# Patient Record
Sex: FEMALE | Race: BLACK OR AFRICAN AMERICAN | NOT HISPANIC OR LATINO | URBAN - METROPOLITAN AREA
[De-identification: names, ages, dates, MRNs, and addresses within clinical notes are randomized per-mention and may not be internally consistent; named-entity substitution may affect disease eponyms.]

---

## 2017-02-28 ENCOUNTER — EMERGENCY (EMERGENCY)
Facility: HOSPITAL | Age: 22
LOS: 1 days | Discharge: HOME | End: 2017-02-28
Attending: EMERGENCY MEDICINE
Payer: SELF-PAY

## 2017-02-28 VITALS
DIASTOLIC BLOOD PRESSURE: 69 MMHG | RESPIRATION RATE: 18 BRPM | SYSTOLIC BLOOD PRESSURE: 130 MMHG | TEMPERATURE: 98 F | HEART RATE: 83 BPM | OXYGEN SATURATION: 100 %

## 2017-02-28 VITALS
RESPIRATION RATE: 18 BRPM | HEIGHT: 61 IN | OXYGEN SATURATION: 100 % | WEIGHT: 160.06 LBS | DIASTOLIC BLOOD PRESSURE: 72 MMHG | TEMPERATURE: 98 F | SYSTOLIC BLOOD PRESSURE: 135 MMHG | HEART RATE: 80 BPM

## 2017-02-28 DIAGNOSIS — O21.9 VOMITING OF PREGNANCY, UNSPECIFIED: ICD-10-CM

## 2017-02-28 DIAGNOSIS — R42 DIZZINESS AND GIDDINESS: ICD-10-CM

## 2017-02-28 DIAGNOSIS — O26.899 OTHER SPECIFIED PREGNANCY RELATED CONDITIONS, UNSPECIFIED TRIMESTER: ICD-10-CM

## 2017-02-28 DIAGNOSIS — Z3A.00 WEEKS OF GESTATION OF PREGNANCY NOT SPECIFIED: ICD-10-CM

## 2017-02-28 LAB
APPEARANCE UR: CLEAR — SIGNIFICANT CHANGE UP
BILIRUB UR-MCNC: NEGATIVE — SIGNIFICANT CHANGE UP
COLOR SPEC: YELLOW — SIGNIFICANT CHANGE UP
DIFF PNL FLD: NEGATIVE — SIGNIFICANT CHANGE UP
GLUCOSE UR QL: NEGATIVE — SIGNIFICANT CHANGE UP
HCG UR QL: POSITIVE
KETONES UR-MCNC: NEGATIVE — SIGNIFICANT CHANGE UP
LEUKOCYTE ESTERASE UR-ACNC: NEGATIVE — SIGNIFICANT CHANGE UP
NITRITE UR-MCNC: NEGATIVE — SIGNIFICANT CHANGE UP
PH UR: 7 — SIGNIFICANT CHANGE UP (ref 4.8–8)
PROT UR-MCNC: NEGATIVE — SIGNIFICANT CHANGE UP
SP GR SPEC: 1.01 — SIGNIFICANT CHANGE UP (ref 1.01–1.02)
UROBILINOGEN FLD QL: NEGATIVE — SIGNIFICANT CHANGE UP

## 2017-02-28 PROCEDURE — 99053 MED SERV 10PM-8AM 24 HR FAC: CPT

## 2017-02-28 PROCEDURE — 99284 EMERGENCY DEPT VISIT MOD MDM: CPT | Mod: 25

## 2017-02-28 PROCEDURE — 81025 URINE PREGNANCY TEST: CPT

## 2017-02-28 PROCEDURE — 93005 ELECTROCARDIOGRAM TRACING: CPT

## 2017-02-28 PROCEDURE — 99283 EMERGENCY DEPT VISIT LOW MDM: CPT

## 2017-02-28 PROCEDURE — 81003 URINALYSIS AUTO W/O SCOPE: CPT

## 2017-02-28 RX ORDER — ONDANSETRON 8 MG/1
4 TABLET, FILM COATED ORAL ONCE
Qty: 0 | Refills: 0 | Status: COMPLETED | OUTPATIENT
Start: 2017-02-28 | End: 2017-02-28

## 2017-02-28 RX ORDER — SODIUM CHLORIDE 9 MG/ML
3 INJECTION INTRAMUSCULAR; INTRAVENOUS; SUBCUTANEOUS ONCE
Qty: 0 | Refills: 0 | Status: COMPLETED | OUTPATIENT
Start: 2017-02-28 | End: 2017-02-28

## 2017-02-28 RX ORDER — SODIUM CHLORIDE 9 MG/ML
1000 INJECTION INTRAMUSCULAR; INTRAVENOUS; SUBCUTANEOUS ONCE
Qty: 0 | Refills: 0 | Status: COMPLETED | OUTPATIENT
Start: 2017-02-28 | End: 2017-02-28

## 2017-02-28 RX ORDER — ONDANSETRON 8 MG/1
4 TABLET, FILM COATED ORAL ONCE
Qty: 0 | Refills: 0 | Status: DISCONTINUED | OUTPATIENT
Start: 2017-02-28 | End: 2017-03-04

## 2017-02-28 NOTE — ED PROVIDER NOTE - MEDICAL DECISION MAKING DETAILS
20 y/o F presenting with syncope and non specific dizziness, nausea, vomiting. Pt has normal neuro exam, benign abd exam and normal EKG in ED. Will r/o pregnancy, anemia, electrolyte abnormality. Will check labs, UA, give Zofran, IV fluid and re-assess.

## 2017-02-28 NOTE — ED PROVIDER NOTE - NS ED MD SCRIBE ATTENDING SCRIBE SECTIONS
PAST MEDICAL/SURGICAL/SOCIAL HISTORY/VITAL SIGNS( Pullset)/REVIEW OF SYSTEMS/DISPOSITION/HIV/HISTORY OF PRESENT ILLNESS/PHYSICAL EXAM

## 2017-02-28 NOTE — ED PROVIDER NOTE - OBJECTIVE STATEMENT
22 y/o F with no significant PMHx presents to ED c/o moderate dizziness, described as lightheadedness, non vertiginous, x 1 week with occasional NBNB vomiting. Pt describes feeling dizzy and faint for several seconds. Denies any injuries, headache, numbness, weakness, chest pain, abd pain, shortness of breath or any other complaints. Pt has positive history of anemia, denies drug use. Pt can not recall LMP. 20 y/o F with no significant PMHx presents to ED c/o moderate dizziness, described as lightheadedness, non vertiginous, x 1 week with occasional NBNB vomiting. Pt describes feeling dizzy and fainted for about 2 seconds. Denies any injuries, headache, numbness, weakness, chest pain, abd pain, shortness of breath or any other complaints. Pt has positive history of anemia, denies drug use. Pt LMP in january possibly, cant recall for sure. no vag bleed or pelvic pain

## 2017-02-28 NOTE — ED PROVIDER NOTE - PROGRESS NOTE DETAILS
pt refusing blood work. afraid of needles/blood draw. unable to convince pt to get test done. pt c +preg. likely explaining her sxs. pt hemodynamically stable. no abd pain/tenderness. can f/u c her OB outpt

## 2017-02-28 NOTE — ED ADULT NURSE NOTE - OBJECTIVE STATEMENT
21 years old female received sitting bed alert and oriented x3, breathing spontaneously on room air. Patient complained of nausea and vomiting for over a week, also reported headache, lightheaded, and stated that she fainted earlier. Patient denies any blurred vision, chest pains or chest discomfort , abdominal pains or diarrhea vomited x5-6.No reports of fever stated. P/S 4-6/10

## 2017-04-02 ENCOUNTER — EMERGENCY (EMERGENCY)
Facility: HOSPITAL | Age: 22
LOS: 1 days | Discharge: HOME | End: 2017-04-02
Attending: EMERGENCY MEDICINE
Payer: MEDICAID

## 2017-04-02 VITALS
SYSTOLIC BLOOD PRESSURE: 111 MMHG | RESPIRATION RATE: 18 BRPM | OXYGEN SATURATION: 98 % | HEART RATE: 86 BPM | DIASTOLIC BLOOD PRESSURE: 66 MMHG | TEMPERATURE: 99 F

## 2017-04-02 VITALS
DIASTOLIC BLOOD PRESSURE: 63 MMHG | HEART RATE: 93 BPM | WEIGHT: 164.02 LBS | RESPIRATION RATE: 16 BRPM | SYSTOLIC BLOOD PRESSURE: 119 MMHG | TEMPERATURE: 99 F | OXYGEN SATURATION: 99 %

## 2017-04-02 DIAGNOSIS — O99.89 OTHER SPECIFIED DISEASES AND CONDITIONS COMPLICATING PREGNANCY, CHILDBIRTH AND THE PUERPERIUM: ICD-10-CM

## 2017-04-02 DIAGNOSIS — M54.5 LOW BACK PAIN: ICD-10-CM

## 2017-04-02 DIAGNOSIS — Z3A.09 9 WEEKS GESTATION OF PREGNANCY: ICD-10-CM

## 2017-04-02 PROCEDURE — 99283 EMERGENCY DEPT VISIT LOW MDM: CPT | Mod: 25

## 2017-04-02 PROCEDURE — 99053 MED SERV 10PM-8AM 24 HR FAC: CPT

## 2017-04-02 PROCEDURE — 99283 EMERGENCY DEPT VISIT LOW MDM: CPT

## 2017-04-02 RX ORDER — ACETAMINOPHEN 500 MG
975 TABLET ORAL ONCE
Qty: 0 | Refills: 0 | Status: COMPLETED | OUTPATIENT
Start: 2017-04-02 | End: 2017-04-02

## 2017-04-02 RX ADMIN — Medication 975 MILLIGRAM(S): at 07:34

## 2017-04-02 NOTE — ED PROVIDER NOTE - OBJECTIVE STATEMENT
23yo F w chr low back pain in the ER for worsening pain, midline. Non-radiating, no neuro stx. No GI/ stx. 9 wks pregnant, 23yo F w chr low back pain in the ER for worsening pain, midline. Non-radiating, no neuro stx. No GI/ stx. 9 wks pregnant, had US w IUP few days ago. No abdom pains, no vag bleeding

## 2017-04-02 NOTE — ED ADULT NURSE NOTE - OBJECTIVE STATEMENT
Pt. is aox3 to by private car c/o lower back pain made worst by pregnancy. pt staes she has had this pain for a long time. now at 9 weeks preg. pain is worst when walking

## 2017-04-02 NOTE — ED PROVIDER NOTE - MEDICAL DECISION MAKING DETAILS
21yo F w chr back pain, no radiation, neuro intact, no CVAT. Likely MSK. Will DC w Tylenol and OB/spine specialist

## 2017-04-02 NOTE — ED ADULT TRIAGE NOTE - CHIEF COMPLAINT QUOTE
severe back pain today , unable to walk normally 8 weeks pregnant, severe back pain today , unable to walk normally

## 2017-04-07 PROBLEM — Z00.00 ENCOUNTER FOR PREVENTIVE HEALTH EXAMINATION: Status: ACTIVE | Noted: 2017-04-07

## 2017-04-12 ENCOUNTER — OUTPATIENT (OUTPATIENT)
Dept: OUTPATIENT SERVICES | Facility: HOSPITAL | Age: 22
LOS: 1 days | End: 2017-04-12

## 2017-04-12 ENCOUNTER — APPOINTMENT (OUTPATIENT)
Dept: ORTHOPEDIC SURGERY | Facility: HOSPITAL | Age: 22
End: 2017-04-12

## 2017-04-12 VITALS
HEART RATE: 66 BPM | BODY MASS INDEX: 31.63 KG/M2 | WEIGHT: 167.5 LBS | DIASTOLIC BLOOD PRESSURE: 57 MMHG | SYSTOLIC BLOOD PRESSURE: 111 MMHG | HEIGHT: 61 IN

## 2017-04-12 DIAGNOSIS — G89.29 LOW BACK PAIN: ICD-10-CM

## 2017-04-12 DIAGNOSIS — M54.5 LOW BACK PAIN: ICD-10-CM

## 2017-04-13 ENCOUNTER — OTHER (OUTPATIENT)
Age: 22
End: 2017-04-13

## 2017-04-13 DIAGNOSIS — M54.5 LOW BACK PAIN: ICD-10-CM

## 2017-05-10 ENCOUNTER — EMERGENCY (EMERGENCY)
Facility: HOSPITAL | Age: 22
LOS: 1 days | Discharge: HOME | End: 2017-05-10
Attending: EMERGENCY MEDICINE
Payer: COMMERCIAL

## 2017-05-10 VITALS
HEART RATE: 100 BPM | RESPIRATION RATE: 20 BRPM | HEIGHT: 63 IN | TEMPERATURE: 98 F | WEIGHT: 160.06 LBS | OXYGEN SATURATION: 99 % | SYSTOLIC BLOOD PRESSURE: 125 MMHG | DIASTOLIC BLOOD PRESSURE: 65 MMHG

## 2017-05-10 LAB
BASOPHILS # BLD AUTO: 0.2 K/UL — SIGNIFICANT CHANGE UP (ref 0–0.2)
BASOPHILS NFR BLD AUTO: 2 % — SIGNIFICANT CHANGE UP (ref 0–2)
EOSINOPHIL # BLD AUTO: 0 K/UL — SIGNIFICANT CHANGE UP (ref 0–0.5)
EOSINOPHIL NFR BLD AUTO: 0.5 % — SIGNIFICANT CHANGE UP (ref 0–6)
HCT VFR BLD CALC: 37.5 % — SIGNIFICANT CHANGE UP (ref 34.5–45)
HGB BLD-MCNC: 12.3 G/DL — SIGNIFICANT CHANGE UP (ref 11.5–15.5)
LYMPHOCYTES # BLD AUTO: 2.3 K/UL — SIGNIFICANT CHANGE UP (ref 1–3.3)
LYMPHOCYTES # BLD AUTO: 24.2 % — SIGNIFICANT CHANGE UP (ref 13–44)
MCHC RBC-ENTMCNC: 29.6 PG — SIGNIFICANT CHANGE UP (ref 27–34)
MCHC RBC-ENTMCNC: 32.8 GM/DL — SIGNIFICANT CHANGE UP (ref 32–36)
MCV RBC AUTO: 90.3 FL — SIGNIFICANT CHANGE UP (ref 80–100)
MONOCYTES # BLD AUTO: 0.6 K/UL — SIGNIFICANT CHANGE UP (ref 0–0.9)
MONOCYTES NFR BLD AUTO: 6.1 % — SIGNIFICANT CHANGE UP (ref 2–14)
NEUTROPHILS # BLD AUTO: 6.3 K/UL — SIGNIFICANT CHANGE UP (ref 1.8–7.4)
NEUTROPHILS NFR BLD AUTO: 67.2 % — SIGNIFICANT CHANGE UP (ref 43–77)
PLATELET # BLD AUTO: 371 K/UL — SIGNIFICANT CHANGE UP (ref 150–400)
RBC # BLD: 4.15 M/UL — SIGNIFICANT CHANGE UP (ref 3.8–5.2)
RBC # FLD: 13.4 % — SIGNIFICANT CHANGE UP (ref 10.3–14.5)
WBC # BLD: 9.4 K/UL — SIGNIFICANT CHANGE UP (ref 3.8–10.5)
WBC # FLD AUTO: 9.4 K/UL — SIGNIFICANT CHANGE UP (ref 3.8–10.5)

## 2017-05-10 PROCEDURE — 99285 EMERGENCY DEPT VISIT HI MDM: CPT | Mod: 25

## 2017-05-10 NOTE — ED PROVIDER NOTE - OBJECTIVE STATEMENT
21 y/o female 15 weeks pregnant with PMHx of Anemia (controlled; blood is O+) presents to the ED c/o vaginal bleeding, sudden onset 30 mins PTA today. Pt reports heavy vaginal bleeding while at work today and notes it was bright red blood with clotting. Pt had previous US indicating pregnancy with OB/GYN at 12 weeks, all normal. Pt denies fever, chills, cramping, similar episodes in the past, or any other complaints. NKDA. OB: Yue Dorman. 21 y/o female 15 weeks pregnant with PMHx of Anemia (controlled; blood is O+ per patient) presents to the ED c/o vaginal bleeding, sudden onset 30 mins PTA today. Pt reports heavy vaginal bleeding while at work today and notes it was bright red blood with no clots. Pt had previous US indicating pregnancy with OB/GYN at 12 weeks, all normal. Pt denies fever, chills, cramping, similar episodes in the past, or any other complaints. NKDA. OB: Yue Carrillo.

## 2017-05-10 NOTE — ED PROVIDER NOTE - NOTES
Case discussed will evaluate pt in the morning. Pt encouraged to do bed rest and refrain from intercourse.

## 2017-05-10 NOTE — ED PROVIDER NOTE - MEDICAL DECISION MAKING DETAILS
Pt is a 23 y/o female with vaginal bleeding 2nd trimester. Pt with no lightheadedness, nausea, vomiting, cramping, asymptomatic in the ED. Plan to get labs, blood work, and pt instructed to f/u with OB/GYN for recheck.

## 2017-05-10 NOTE — ED PROVIDER NOTE - NS ED MD SCRIBE ATTENDING SCRIBE SECTIONS
HISTORY OF PRESENT ILLNESS/VITAL SIGNS( Pullset)/HIV/DISPOSITION/PROVIDER CARE INITIATION/REVIEW OF SYSTEMS/PHYSICAL EXAM

## 2017-05-10 NOTE — ED ADULT NURSE NOTE - OBJECTIVE STATEMENT
PT P/W VAG BLEED-BRIGHT RED BLOOD SINCE TODAY --NO CRAMPING OR PAIN NOTED LMP 1/20/17?  15 WEEKS PREGNANT

## 2017-05-11 VITALS
DIASTOLIC BLOOD PRESSURE: 74 MMHG | RESPIRATION RATE: 17 BRPM | SYSTOLIC BLOOD PRESSURE: 124 MMHG | HEART RATE: 91 BPM | OXYGEN SATURATION: 99 %

## 2017-05-11 LAB
ALBUMIN SERPL ELPH-MCNC: 3 G/DL — LOW (ref 3.5–5)
ALP SERPL-CCNC: 68 U/L — SIGNIFICANT CHANGE UP (ref 40–120)
ALT FLD-CCNC: 20 U/L DA — SIGNIFICANT CHANGE UP (ref 10–60)
ANION GAP SERPL CALC-SCNC: 8 MMOL/L — SIGNIFICANT CHANGE UP (ref 5–17)
APPEARANCE UR: CLEAR — SIGNIFICANT CHANGE UP
AST SERPL-CCNC: 16 U/L — SIGNIFICANT CHANGE UP (ref 10–40)
BILIRUB SERPL-MCNC: 0.2 MG/DL — SIGNIFICANT CHANGE UP (ref 0.2–1.2)
BILIRUB UR-MCNC: NEGATIVE — SIGNIFICANT CHANGE UP
BUN SERPL-MCNC: 5 MG/DL — LOW (ref 7–18)
CALCIUM SERPL-MCNC: 9 MG/DL — SIGNIFICANT CHANGE UP (ref 8.4–10.5)
CHLORIDE SERPL-SCNC: 107 MMOL/L — SIGNIFICANT CHANGE UP (ref 96–108)
CO2 SERPL-SCNC: 24 MMOL/L — SIGNIFICANT CHANGE UP (ref 22–31)
COLOR SPEC: YELLOW — SIGNIFICANT CHANGE UP
CREAT SERPL-MCNC: 0.56 MG/DL — SIGNIFICANT CHANGE UP (ref 0.5–1.3)
DIFF PNL FLD: ABNORMAL
GLUCOSE SERPL-MCNC: 79 MG/DL — SIGNIFICANT CHANGE UP (ref 70–99)
GLUCOSE UR QL: NEGATIVE — SIGNIFICANT CHANGE UP
HCG SERPL-ACNC: SIGNIFICANT CHANGE UP MIU/ML
HCG UR QL: POSITIVE
KETONES UR-MCNC: ABNORMAL
LEUKOCYTE ESTERASE UR-ACNC: ABNORMAL
NITRITE UR-MCNC: NEGATIVE — SIGNIFICANT CHANGE UP
PH UR: 6 — SIGNIFICANT CHANGE UP (ref 5–8)
POTASSIUM SERPL-MCNC: 4 MMOL/L — SIGNIFICANT CHANGE UP (ref 3.5–5.3)
POTASSIUM SERPL-SCNC: 4 MMOL/L — SIGNIFICANT CHANGE UP (ref 3.5–5.3)
PROT SERPL-MCNC: 7.1 G/DL — SIGNIFICANT CHANGE UP (ref 6–8.3)
PROT UR-MCNC: 15
SODIUM SERPL-SCNC: 139 MMOL/L — SIGNIFICANT CHANGE UP (ref 135–145)
SP GR SPEC: 1.02 — SIGNIFICANT CHANGE UP (ref 1.01–1.02)
UROBILINOGEN FLD QL: NEGATIVE — SIGNIFICANT CHANGE UP

## 2017-05-11 PROCEDURE — 86900 BLOOD TYPING SEROLOGIC ABO: CPT

## 2017-05-11 PROCEDURE — 85027 COMPLETE CBC AUTOMATED: CPT

## 2017-05-11 PROCEDURE — 99283 EMERGENCY DEPT VISIT LOW MDM: CPT

## 2017-05-11 PROCEDURE — 81025 URINE PREGNANCY TEST: CPT

## 2017-05-11 PROCEDURE — 86901 BLOOD TYPING SEROLOGIC RH(D): CPT

## 2017-05-11 PROCEDURE — 80053 COMPREHEN METABOLIC PANEL: CPT

## 2017-05-11 PROCEDURE — 84702 CHORIONIC GONADOTROPIN TEST: CPT

## 2017-05-11 PROCEDURE — 86850 RBC ANTIBODY SCREEN: CPT

## 2017-05-11 PROCEDURE — 81001 URINALYSIS AUTO W/SCOPE: CPT

## 2017-05-14 DIAGNOSIS — Z3A.15 15 WEEKS GESTATION OF PREGNANCY: ICD-10-CM

## 2017-05-14 DIAGNOSIS — O46.92 ANTEPARTUM HEMORRHAGE, UNSPECIFIED, SECOND TRIMESTER: ICD-10-CM

## 2017-07-22 ENCOUNTER — FORM ENCOUNTER (OUTPATIENT)
Age: 22
End: 2017-07-22

## 2017-07-23 ENCOUNTER — OUTPATIENT (OUTPATIENT)
Dept: OUTPATIENT SERVICES | Facility: HOSPITAL | Age: 22
LOS: 1 days | End: 2017-07-23
Payer: COMMERCIAL

## 2017-07-23 DIAGNOSIS — O26.899 OTHER SPECIFIED PREGNANCY RELATED CONDITIONS, UNSPECIFIED TRIMESTER: ICD-10-CM

## 2017-07-23 DIAGNOSIS — Z3A.00 WEEKS OF GESTATION OF PREGNANCY NOT SPECIFIED: ICD-10-CM

## 2017-07-23 LAB
APPEARANCE UR: CLEAR — SIGNIFICANT CHANGE UP
BASOPHILS # BLD AUTO: 0.1 K/UL — SIGNIFICANT CHANGE UP (ref 0–0.2)
BASOPHILS NFR BLD AUTO: 0.9 % — SIGNIFICANT CHANGE UP (ref 0–2)
BILIRUB UR-MCNC: NEGATIVE — SIGNIFICANT CHANGE UP
COLOR SPEC: YELLOW — SIGNIFICANT CHANGE UP
DIFF PNL FLD: ABNORMAL
EOSINOPHIL # BLD AUTO: 0.1 K/UL — SIGNIFICANT CHANGE UP (ref 0–0.5)
EOSINOPHIL NFR BLD AUTO: 0.5 % — SIGNIFICANT CHANGE UP (ref 0–6)
GLUCOSE UR QL: NEGATIVE — SIGNIFICANT CHANGE UP
HCT VFR BLD CALC: 31.7 % — LOW (ref 34.5–45)
HGB BLD-MCNC: 10.6 G/DL — LOW (ref 11.5–15.5)
KETONES UR-MCNC: NEGATIVE — SIGNIFICANT CHANGE UP
LEUKOCYTE ESTERASE UR-ACNC: NEGATIVE — SIGNIFICANT CHANGE UP
LYMPHOCYTES # BLD AUTO: 2.7 K/UL — SIGNIFICANT CHANGE UP (ref 1–3.3)
LYMPHOCYTES # BLD AUTO: 28.4 % — SIGNIFICANT CHANGE UP (ref 13–44)
MCHC RBC-ENTMCNC: 30.3 PG — SIGNIFICANT CHANGE UP (ref 27–34)
MCHC RBC-ENTMCNC: 33.5 GM/DL — SIGNIFICANT CHANGE UP (ref 32–36)
MCV RBC AUTO: 90.5 FL — SIGNIFICANT CHANGE UP (ref 80–100)
MONOCYTES # BLD AUTO: 0.9 K/UL — SIGNIFICANT CHANGE UP (ref 0–0.9)
MONOCYTES NFR BLD AUTO: 9 % — SIGNIFICANT CHANGE UP (ref 2–14)
NEUTROPHILS # BLD AUTO: 5.8 K/UL — SIGNIFICANT CHANGE UP (ref 1.8–7.4)
NEUTROPHILS NFR BLD AUTO: 61.2 % — SIGNIFICANT CHANGE UP (ref 43–77)
NITRITE UR-MCNC: NEGATIVE — SIGNIFICANT CHANGE UP
PH UR: 7 — SIGNIFICANT CHANGE UP (ref 5–8)
PLATELET # BLD AUTO: 347 K/UL — SIGNIFICANT CHANGE UP (ref 150–400)
PROT UR-MCNC: NEGATIVE — SIGNIFICANT CHANGE UP
RBC # BLD: 3.5 M/UL — LOW (ref 3.8–5.2)
RBC # FLD: 13.8 % — SIGNIFICANT CHANGE UP (ref 10.3–14.5)
SP GR SPEC: 1 — LOW (ref 1.01–1.02)
UROBILINOGEN FLD QL: NEGATIVE — SIGNIFICANT CHANGE UP
WBC # BLD: 9.5 K/UL — SIGNIFICANT CHANGE UP (ref 3.8–10.5)
WBC # FLD AUTO: 9.5 K/UL — SIGNIFICANT CHANGE UP (ref 3.8–10.5)

## 2017-07-23 PROCEDURE — 76817 TRANSVAGINAL US OBSTETRIC: CPT | Mod: 26

## 2017-07-23 PROCEDURE — 76815 OB US LIMITED FETUS(S): CPT | Mod: 26

## 2017-07-23 RX ORDER — SODIUM CHLORIDE 9 MG/ML
1000 INJECTION, SOLUTION INTRAVENOUS
Qty: 0 | Refills: 0 | Status: DISCONTINUED | OUTPATIENT
Start: 2017-07-23 | End: 2017-08-07

## 2017-07-23 RX ADMIN — SODIUM CHLORIDE 125 MILLILITER(S): 9 INJECTION, SOLUTION INTRAVENOUS at 21:19

## 2017-10-13 ENCOUNTER — FORM ENCOUNTER (OUTPATIENT)
Age: 22
End: 2017-10-13

## 2017-10-14 ENCOUNTER — OUTPATIENT (OUTPATIENT)
Dept: OUTPATIENT SERVICES | Facility: HOSPITAL | Age: 22
LOS: 1 days | End: 2017-10-14
Payer: COMMERCIAL

## 2017-10-14 DIAGNOSIS — O26.899 OTHER SPECIFIED PREGNANCY RELATED CONDITIONS, UNSPECIFIED TRIMESTER: ICD-10-CM

## 2017-10-14 DIAGNOSIS — Z3A.00 WEEKS OF GESTATION OF PREGNANCY NOT SPECIFIED: ICD-10-CM

## 2017-10-14 LAB
ANION GAP SERPL CALC-SCNC: 11 MMOL/L — SIGNIFICANT CHANGE UP (ref 5–17)
APPEARANCE UR: CLEAR — SIGNIFICANT CHANGE UP
BASOPHILS # BLD AUTO: 0.1 K/UL — SIGNIFICANT CHANGE UP (ref 0–0.2)
BASOPHILS NFR BLD AUTO: 1 % — SIGNIFICANT CHANGE UP (ref 0–2)
BILIRUB UR-MCNC: NEGATIVE — SIGNIFICANT CHANGE UP
BUN SERPL-MCNC: 4 MG/DL — LOW (ref 7–18)
CALCIUM SERPL-MCNC: 8.6 MG/DL — SIGNIFICANT CHANGE UP (ref 8.4–10.5)
CHLORIDE SERPL-SCNC: 109 MMOL/L — HIGH (ref 96–108)
CO2 SERPL-SCNC: 20 MMOL/L — LOW (ref 22–31)
COLOR SPEC: YELLOW — SIGNIFICANT CHANGE UP
CREAT SERPL-MCNC: 0.49 MG/DL — LOW (ref 0.5–1.3)
DIFF PNL FLD: NEGATIVE — SIGNIFICANT CHANGE UP
EOSINOPHIL # BLD AUTO: 0 K/UL — SIGNIFICANT CHANGE UP (ref 0–0.5)
EOSINOPHIL NFR BLD AUTO: 0.6 % — SIGNIFICANT CHANGE UP (ref 0–6)
GLUCOSE SERPL-MCNC: 84 MG/DL — SIGNIFICANT CHANGE UP (ref 70–99)
GLUCOSE UR QL: NEGATIVE — SIGNIFICANT CHANGE UP
HCT VFR BLD CALC: 35.2 % — SIGNIFICANT CHANGE UP (ref 34.5–45)
HGB BLD-MCNC: 11.4 G/DL — LOW (ref 11.5–15.5)
KETONES UR-MCNC: NEGATIVE — SIGNIFICANT CHANGE UP
LEUKOCYTE ESTERASE UR-ACNC: ABNORMAL
LYMPHOCYTES # BLD AUTO: 1.6 K/UL — SIGNIFICANT CHANGE UP (ref 1–3.3)
LYMPHOCYTES # BLD AUTO: 27 % — SIGNIFICANT CHANGE UP (ref 13–44)
MCHC RBC-ENTMCNC: 30.3 PG — SIGNIFICANT CHANGE UP (ref 27–34)
MCHC RBC-ENTMCNC: 32.3 GM/DL — SIGNIFICANT CHANGE UP (ref 32–36)
MCV RBC AUTO: 93.9 FL — SIGNIFICANT CHANGE UP (ref 80–100)
MONOCYTES # BLD AUTO: 0.4 K/UL — SIGNIFICANT CHANGE UP (ref 0–0.9)
MONOCYTES NFR BLD AUTO: 6 % — SIGNIFICANT CHANGE UP (ref 2–14)
NEUTROPHILS # BLD AUTO: 4 K/UL — SIGNIFICANT CHANGE UP (ref 1.8–7.4)
NEUTROPHILS NFR BLD AUTO: 65.4 % — SIGNIFICANT CHANGE UP (ref 43–77)
NITRITE UR-MCNC: NEGATIVE — SIGNIFICANT CHANGE UP
PH UR: 8 — SIGNIFICANT CHANGE UP (ref 5–8)
PLATELET # BLD AUTO: 280 K/UL — SIGNIFICANT CHANGE UP (ref 150–400)
POTASSIUM SERPL-MCNC: 4.2 MMOL/L — SIGNIFICANT CHANGE UP (ref 3.5–5.3)
POTASSIUM SERPL-SCNC: 4.2 MMOL/L — SIGNIFICANT CHANGE UP (ref 3.5–5.3)
PROT UR-MCNC: 15
RBC # BLD: 3.75 M/UL — LOW (ref 3.8–5.2)
RBC # FLD: 14.2 % — SIGNIFICANT CHANGE UP (ref 10.3–14.5)
SODIUM SERPL-SCNC: 140 MMOL/L — SIGNIFICANT CHANGE UP (ref 135–145)
SP GR SPEC: 1.01 — SIGNIFICANT CHANGE UP (ref 1.01–1.02)
UROBILINOGEN FLD QL: NEGATIVE — SIGNIFICANT CHANGE UP
WBC # BLD: 6.1 K/UL — SIGNIFICANT CHANGE UP (ref 3.8–10.5)
WBC # FLD AUTO: 6.1 K/UL — SIGNIFICANT CHANGE UP (ref 3.8–10.5)

## 2017-10-14 PROCEDURE — 76819 FETAL BIOPHYS PROFIL W/O NST: CPT

## 2017-10-14 PROCEDURE — 36415 COLL VENOUS BLD VENIPUNCTURE: CPT

## 2017-10-14 PROCEDURE — 81001 URINALYSIS AUTO W/SCOPE: CPT

## 2017-10-14 PROCEDURE — 87086 URINE CULTURE/COLONY COUNT: CPT

## 2017-10-14 PROCEDURE — 85027 COMPLETE CBC AUTOMATED: CPT

## 2017-10-14 PROCEDURE — G0463: CPT

## 2017-10-14 PROCEDURE — 59025 FETAL NON-STRESS TEST: CPT

## 2017-10-14 PROCEDURE — 76815 OB US LIMITED FETUS(S): CPT | Mod: 26

## 2017-10-14 PROCEDURE — 76817 TRANSVAGINAL US OBSTETRIC: CPT

## 2017-10-14 PROCEDURE — 80048 BASIC METABOLIC PNL TOTAL CA: CPT

## 2017-10-14 PROCEDURE — 93005 ELECTROCARDIOGRAM TRACING: CPT

## 2017-10-14 PROCEDURE — 76815 OB US LIMITED FETUS(S): CPT

## 2017-10-14 PROCEDURE — 76819 FETAL BIOPHYS PROFIL W/O NST: CPT | Mod: 26

## 2017-10-14 RX ORDER — SODIUM CHLORIDE 9 MG/ML
1000 INJECTION, SOLUTION INTRAVENOUS ONCE
Qty: 0 | Refills: 0 | Status: COMPLETED | OUTPATIENT
Start: 2017-10-14 | End: 2017-10-14

## 2017-10-14 RX ORDER — SODIUM CHLORIDE 9 MG/ML
1000 INJECTION, SOLUTION INTRAVENOUS
Qty: 0 | Refills: 0 | Status: DISCONTINUED | OUTPATIENT
Start: 2017-10-14 | End: 2017-10-29

## 2017-10-14 RX ADMIN — SODIUM CHLORIDE 2000 MILLILITER(S): 9 INJECTION, SOLUTION INTRAVENOUS at 11:05

## 2017-10-14 RX ADMIN — SODIUM CHLORIDE 125 MILLILITER(S): 9 INJECTION, SOLUTION INTRAVENOUS at 15:01

## 2017-10-15 LAB
CULTURE RESULTS: NO GROWTH — SIGNIFICANT CHANGE UP
SPECIMEN SOURCE: SIGNIFICANT CHANGE UP

## 2017-10-28 ENCOUNTER — OUTPATIENT (OUTPATIENT)
Dept: OUTPATIENT SERVICES | Facility: HOSPITAL | Age: 22
LOS: 1 days | End: 2017-10-28
Payer: COMMERCIAL

## 2017-10-28 DIAGNOSIS — Z3A.00 WEEKS OF GESTATION OF PREGNANCY NOT SPECIFIED: ICD-10-CM

## 2017-10-28 DIAGNOSIS — O26.899 OTHER SPECIFIED PREGNANCY RELATED CONDITIONS, UNSPECIFIED TRIMESTER: ICD-10-CM

## 2017-10-28 PROCEDURE — 59025 FETAL NON-STRESS TEST: CPT

## 2017-10-28 PROCEDURE — G0463: CPT

## 2017-11-08 ENCOUNTER — EMERGENCY (EMERGENCY)
Facility: HOSPITAL | Age: 22
LOS: 1 days | Discharge: STILL ADMITTED OR ADMINISTRATIVE | End: 2017-11-08
Admitting: EMERGENCY MEDICINE

## 2017-11-08 ENCOUNTER — OUTPATIENT (OUTPATIENT)
Dept: OUTPATIENT SERVICES | Facility: HOSPITAL | Age: 22
LOS: 1 days | End: 2017-11-08

## 2017-11-08 VITALS
TEMPERATURE: 98 F | DIASTOLIC BLOOD PRESSURE: 60 MMHG | HEART RATE: 78 BPM | RESPIRATION RATE: 16 BRPM | SYSTOLIC BLOOD PRESSURE: 115 MMHG | OXYGEN SATURATION: 100 %

## 2017-11-08 DIAGNOSIS — Z3A.00 WEEKS OF GESTATION OF PREGNANCY NOT SPECIFIED: ICD-10-CM

## 2017-11-08 DIAGNOSIS — O26.90 PREGNANCY RELATED CONDITIONS, UNSPECIFIED, UNSPECIFIED TRIMESTER: ICD-10-CM

## 2017-11-08 NOTE — ED ADULT TRIAGE NOTE - CHIEF COMPLAINT QUOTE
pt c.o contractions, pt 41 weeks pregnant (GS 11/1/17). denies rupture of membranes. L&D np kade called, pt taken directly to labor and delivery.

## 2018-05-09 ENCOUNTER — EMERGENCY (EMERGENCY)
Facility: HOSPITAL | Age: 23
LOS: 1 days | Discharge: HOME | End: 2018-05-09
Attending: EMERGENCY MEDICINE
Payer: COMMERCIAL

## 2018-05-09 VITALS
RESPIRATION RATE: 16 BRPM | DIASTOLIC BLOOD PRESSURE: 74 MMHG | HEART RATE: 99 BPM | SYSTOLIC BLOOD PRESSURE: 118 MMHG | OXYGEN SATURATION: 100 % | TEMPERATURE: 99 F

## 2018-05-09 VITALS
HEIGHT: 61 IN | SYSTOLIC BLOOD PRESSURE: 119 MMHG | HEART RATE: 104 BPM | OXYGEN SATURATION: 100 % | RESPIRATION RATE: 16 BRPM | WEIGHT: 173.94 LBS | DIASTOLIC BLOOD PRESSURE: 79 MMHG | TEMPERATURE: 98 F

## 2018-05-09 LAB
ALBUMIN SERPL ELPH-MCNC: 3.4 G/DL — LOW (ref 3.5–5)
ALP SERPL-CCNC: 76 U/L — SIGNIFICANT CHANGE UP (ref 40–120)
ALT FLD-CCNC: 60 U/L DA — SIGNIFICANT CHANGE UP (ref 10–60)
ANION GAP SERPL CALC-SCNC: 8 MMOL/L — SIGNIFICANT CHANGE UP (ref 5–17)
AST SERPL-CCNC: 24 U/L — SIGNIFICANT CHANGE UP (ref 10–40)
BASOPHILS # BLD AUTO: 0.1 K/UL — SIGNIFICANT CHANGE UP (ref 0–0.2)
BASOPHILS NFR BLD AUTO: 0.9 % — SIGNIFICANT CHANGE UP (ref 0–2)
BILIRUB SERPL-MCNC: 0.9 MG/DL — SIGNIFICANT CHANGE UP (ref 0.2–1.2)
BUN SERPL-MCNC: 8 MG/DL — SIGNIFICANT CHANGE UP (ref 7–18)
CALCIUM SERPL-MCNC: 8.8 MG/DL — SIGNIFICANT CHANGE UP (ref 8.4–10.5)
CHLORIDE SERPL-SCNC: 108 MMOL/L — SIGNIFICANT CHANGE UP (ref 96–108)
CO2 SERPL-SCNC: 24 MMOL/L — SIGNIFICANT CHANGE UP (ref 22–31)
CREAT SERPL-MCNC: 0.77 MG/DL — SIGNIFICANT CHANGE UP (ref 0.5–1.3)
EOSINOPHIL # BLD AUTO: 0 K/UL — SIGNIFICANT CHANGE UP (ref 0–0.5)
EOSINOPHIL NFR BLD AUTO: 0.1 % — SIGNIFICANT CHANGE UP (ref 0–6)
GLUCOSE SERPL-MCNC: 88 MG/DL — SIGNIFICANT CHANGE UP (ref 70–99)
HCT VFR BLD CALC: 45.2 % — HIGH (ref 34.5–45)
HGB BLD-MCNC: 14.9 G/DL — SIGNIFICANT CHANGE UP (ref 11.5–15.5)
LYMPHOCYTES # BLD AUTO: 0.6 K/UL — LOW (ref 1–3.3)
LYMPHOCYTES # BLD AUTO: 9.2 % — LOW (ref 13–44)
MCHC RBC-ENTMCNC: 29.9 PG — SIGNIFICANT CHANGE UP (ref 27–34)
MCHC RBC-ENTMCNC: 32.9 GM/DL — SIGNIFICANT CHANGE UP (ref 32–36)
MCV RBC AUTO: 90.9 FL — SIGNIFICANT CHANGE UP (ref 80–100)
MONOCYTES # BLD AUTO: 0.3 K/UL — SIGNIFICANT CHANGE UP (ref 0–0.9)
MONOCYTES NFR BLD AUTO: 4 % — SIGNIFICANT CHANGE UP (ref 2–14)
NEUTROPHILS # BLD AUTO: 6 K/UL — SIGNIFICANT CHANGE UP (ref 1.8–7.4)
NEUTROPHILS NFR BLD AUTO: 85.8 % — HIGH (ref 43–77)
PLATELET # BLD AUTO: 381 K/UL — SIGNIFICANT CHANGE UP (ref 150–400)
POTASSIUM SERPL-MCNC: 3.9 MMOL/L — SIGNIFICANT CHANGE UP (ref 3.5–5.3)
POTASSIUM SERPL-SCNC: 3.9 MMOL/L — SIGNIFICANT CHANGE UP (ref 3.5–5.3)
PROT SERPL-MCNC: 7.8 G/DL — SIGNIFICANT CHANGE UP (ref 6–8.3)
RBC # BLD: 4.97 M/UL — SIGNIFICANT CHANGE UP (ref 3.8–5.2)
RBC # FLD: 11.7 % — SIGNIFICANT CHANGE UP (ref 10.3–14.5)
SODIUM SERPL-SCNC: 140 MMOL/L — SIGNIFICANT CHANGE UP (ref 135–145)
WBC # BLD: 7 K/UL — SIGNIFICANT CHANGE UP (ref 3.8–10.5)
WBC # FLD AUTO: 7 K/UL — SIGNIFICANT CHANGE UP (ref 3.8–10.5)

## 2018-05-09 PROCEDURE — 71046 X-RAY EXAM CHEST 2 VIEWS: CPT | Mod: 26

## 2018-05-09 PROCEDURE — 99284 EMERGENCY DEPT VISIT MOD MDM: CPT | Mod: 25

## 2018-05-09 RX ORDER — SUCRALFATE 1 G
10 TABLET ORAL ONCE
Qty: 0 | Refills: 0 | Status: DISCONTINUED | OUTPATIENT
Start: 2018-05-09 | End: 2018-05-10

## 2018-05-09 RX ORDER — FAMOTIDINE 10 MG/ML
20 INJECTION INTRAVENOUS ONCE
Qty: 0 | Refills: 0 | Status: DISCONTINUED | OUTPATIENT
Start: 2018-05-09 | End: 2018-05-09

## 2018-05-09 RX ORDER — FAMOTIDINE 10 MG/ML
20 INJECTION INTRAVENOUS ONCE
Qty: 0 | Refills: 0 | Status: COMPLETED | OUTPATIENT
Start: 2018-05-09 | End: 2018-05-09

## 2018-05-09 RX ORDER — MORPHINE SULFATE 50 MG/1
4 CAPSULE, EXTENDED RELEASE ORAL ONCE
Qty: 0 | Refills: 0 | Status: DISCONTINUED | OUTPATIENT
Start: 2018-05-09 | End: 2018-05-09

## 2018-05-09 RX ORDER — ONDANSETRON 8 MG/1
4 TABLET, FILM COATED ORAL ONCE
Qty: 0 | Refills: 0 | Status: COMPLETED | OUTPATIENT
Start: 2018-05-09 | End: 2018-05-09

## 2018-05-09 RX ORDER — SODIUM CHLORIDE 9 MG/ML
2000 INJECTION INTRAMUSCULAR; INTRAVENOUS; SUBCUTANEOUS ONCE
Qty: 0 | Refills: 0 | Status: COMPLETED | OUTPATIENT
Start: 2018-05-09 | End: 2018-05-09

## 2018-05-09 RX ADMIN — SODIUM CHLORIDE 2000 MILLILITER(S): 9 INJECTION INTRAMUSCULAR; INTRAVENOUS; SUBCUTANEOUS at 23:10

## 2018-05-09 RX ADMIN — FAMOTIDINE 104 MILLIGRAM(S): 10 INJECTION INTRAVENOUS at 23:11

## 2018-05-09 RX ADMIN — MORPHINE SULFATE 4 MILLIGRAM(S): 50 CAPSULE, EXTENDED RELEASE ORAL at 23:09

## 2018-05-09 RX ADMIN — ONDANSETRON 4 MILLIGRAM(S): 8 TABLET, FILM COATED ORAL at 23:11

## 2018-05-09 RX ADMIN — MORPHINE SULFATE 4 MILLIGRAM(S): 50 CAPSULE, EXTENDED RELEASE ORAL at 23:12

## 2018-05-09 NOTE — ED PROVIDER NOTE - CHPI ED SYMPTOMS NEG
no fever, no chills, no shortness of breath, no cough, no dysuria, no urinary frequency, no hematuria, no urinary/bowel incontinence, no tongue biting
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
## 2018-05-10 LAB
APPEARANCE UR: ABNORMAL
BACTERIA # UR AUTO: ABNORMAL /HPF
BILIRUB UR-MCNC: NEGATIVE — SIGNIFICANT CHANGE UP
COLOR SPEC: YELLOW — SIGNIFICANT CHANGE UP
DIFF PNL FLD: ABNORMAL
EPI CELLS # UR: SIGNIFICANT CHANGE UP /HPF
GLUCOSE UR QL: NEGATIVE — SIGNIFICANT CHANGE UP
HCG UR QL: NEGATIVE — SIGNIFICANT CHANGE UP
KETONES UR-MCNC: ABNORMAL
LEUKOCYTE ESTERASE UR-ACNC: ABNORMAL
NITRITE UR-MCNC: POSITIVE
PH UR: 6.5 — SIGNIFICANT CHANGE UP (ref 5–8)
PROT UR-MCNC: 15
RBC CASTS # UR COMP ASSIST: SIGNIFICANT CHANGE UP /HPF (ref 0–2)
SP GR SPEC: 1.01 — SIGNIFICANT CHANGE UP (ref 1.01–1.02)
UROBILINOGEN FLD QL: 1
WBC UR QL: SIGNIFICANT CHANGE UP /HPF (ref 0–5)

## 2018-05-10 PROCEDURE — 96374 THER/PROPH/DIAG INJ IV PUSH: CPT

## 2018-05-10 PROCEDURE — 99284 EMERGENCY DEPT VISIT MOD MDM: CPT | Mod: 25

## 2018-05-10 PROCEDURE — 80053 COMPREHEN METABOLIC PANEL: CPT

## 2018-05-10 PROCEDURE — 96375 TX/PRO/DX INJ NEW DRUG ADDON: CPT

## 2018-05-10 PROCEDURE — 85027 COMPLETE CBC AUTOMATED: CPT

## 2018-05-10 PROCEDURE — 81001 URINALYSIS AUTO W/SCOPE: CPT

## 2018-05-10 PROCEDURE — 82962 GLUCOSE BLOOD TEST: CPT

## 2018-05-10 PROCEDURE — 71046 X-RAY EXAM CHEST 2 VIEWS: CPT

## 2018-05-10 PROCEDURE — 93005 ELECTROCARDIOGRAM TRACING: CPT

## 2018-05-10 PROCEDURE — 81025 URINE PREGNANCY TEST: CPT

## 2018-05-10 RX ORDER — FAMOTIDINE 10 MG/ML
1 INJECTION INTRAVENOUS
Qty: 7 | Refills: 0
Start: 2018-05-10 | End: 2018-05-16

## 2018-05-10 RX ORDER — ONDANSETRON 8 MG/1
1 TABLET, FILM COATED ORAL
Qty: 6 | Refills: 0
Start: 2018-05-10 | End: 2018-05-11

## 2018-05-10 RX ORDER — SUCRALFATE 1 G
1 TABLET ORAL ONCE
Qty: 0 | Refills: 0 | Status: COMPLETED | OUTPATIENT
Start: 2018-05-10 | End: 2018-05-10

## 2018-05-10 RX ORDER — SUCRALFATE 1 G
10 TABLET ORAL
Qty: 210 | Refills: 0
Start: 2018-05-10 | End: 2018-05-16

## 2018-05-10 RX ADMIN — Medication 1 GRAM(S): at 02:53

## 2018-05-10 NOTE — ED ADULT NURSE NOTE - OBJECTIVE STATEMENT
Patient is hemodynamically stable and in no acute distress, speaking in full sentences and breathing comfortably in room air. Medicated as per order.

## 2019-05-10 ENCOUNTER — EMERGENCY (EMERGENCY)
Facility: HOSPITAL | Age: 24
LOS: 0 days | Discharge: HOME | End: 2019-05-10
Attending: EMERGENCY MEDICINE
Payer: OTHER MISCELLANEOUS

## 2019-05-10 VITALS
DIASTOLIC BLOOD PRESSURE: 84 MMHG | HEART RATE: 78 BPM | WEIGHT: 179.9 LBS | HEIGHT: 61 IN | TEMPERATURE: 99 F | RESPIRATION RATE: 15 BRPM | SYSTOLIC BLOOD PRESSURE: 130 MMHG | OXYGEN SATURATION: 100 %

## 2019-05-10 DIAGNOSIS — R52 PAIN, UNSPECIFIED: ICD-10-CM

## 2019-05-10 DIAGNOSIS — R10.32 LEFT LOWER QUADRANT PAIN: ICD-10-CM

## 2019-05-10 DIAGNOSIS — T14.90XA INJURY, UNSPECIFIED, INITIAL ENCOUNTER: ICD-10-CM
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
## 2019-05-10 PROCEDURE — 99284 EMERGENCY DEPT VISIT MOD MDM: CPT | Mod: 25

## 2019-05-10 PROCEDURE — 99053 MED SERV 10PM-8AM 24 HR FAC: CPT

## 2019-05-10 RX ORDER — ACETAMINOPHEN 500 MG
650 TABLET ORAL ONCE
Refills: 0 | Status: COMPLETED | OUTPATIENT
Start: 2019-05-10 | End: 2019-05-10

## 2019-05-10 RX ADMIN — Medication 650 MILLIGRAM(S): at 08:15
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
## 2019-05-10 NOTE — ED ADULT NURSE NOTE - NSIMPLEMENTINTERV_GEN_ALL_ED
Implemented All Universal Safety Interventions:  [place] to call system. Call bell, personal items and telephone within reach. Instruct patient to call for assistance. Room bathroom lighting operational. Non-slip footwear when patient is off stretcher. Physically safe environment: no spills, clutter or unnecessary equipment. Stretcher in lowest position, wheels locked, appropriate side rails in place.

## 2019-05-10 NOTE — ED PROVIDER NOTE - PHYSICAL EXAMINATION
Gen: Alert, Well appearing. NAD    Head: NC, AT, PERRL, EOMI, normal lids/conjunctiva   ENT: Bilateral TM WNL, patent oropharynx without erythema/exudate, uvula midline  Neck: supple, no tenderness/meningismus  Pulm: Bilateral clear BS, normal resp effort, no wheeze/stridor/retractions  CV: RRR, no M/R/G, +dist pulses   Abd: soft, + mild focal tenderness to light palpation in LLQ, ND, +BS, no guarding/rebound tenderness  Mskel: no edema/erythema/cyanosis   Skin: no rash   Neuro: AAOx3, no sensory/motor deficits, CN 2-12 intact    bedside sono: no ff, hematoma
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
## 2019-05-12 PROBLEM — D64.9 ANEMIA, UNSPECIFIED: Chronic | Status: ACTIVE | Noted: 2017-04-02

## 2019-05-12 PROBLEM — D64.9 ANEMIA, UNSPECIFIED: Chronic | Status: ACTIVE | Noted: 2018-05-10

## 2019-06-24 ENCOUNTER — EMERGENCY (EMERGENCY)
Facility: HOSPITAL | Age: 24
LOS: 0 days | Discharge: HOME | End: 2019-06-24
Attending: STUDENT IN AN ORGANIZED HEALTH CARE EDUCATION/TRAINING PROGRAM
Payer: MEDICAID

## 2019-06-24 VITALS
DIASTOLIC BLOOD PRESSURE: 68 MMHG | HEART RATE: 64 BPM | SYSTOLIC BLOOD PRESSURE: 109 MMHG | TEMPERATURE: 98 F | OXYGEN SATURATION: 100 % | RESPIRATION RATE: 12 BRPM

## 2019-06-24 VITALS
OXYGEN SATURATION: 99 % | SYSTOLIC BLOOD PRESSURE: 128 MMHG | TEMPERATURE: 98 F | DIASTOLIC BLOOD PRESSURE: 83 MMHG | HEIGHT: 61 IN | RESPIRATION RATE: 18 BRPM | HEART RATE: 75 BPM | WEIGHT: 179.9 LBS

## 2019-06-24 DIAGNOSIS — R55 SYNCOPE AND COLLAPSE: ICD-10-CM

## 2019-06-24 DIAGNOSIS — D64.9 ANEMIA, UNSPECIFIED: ICD-10-CM

## 2019-06-24 LAB
ALBUMIN SERPL ELPH-MCNC: 3.2 G/DL — LOW (ref 3.3–5)
ALP SERPL-CCNC: 79 U/L — SIGNIFICANT CHANGE UP (ref 40–120)
ALT FLD-CCNC: 19 U/L — SIGNIFICANT CHANGE UP (ref 12–78)
ANION GAP SERPL CALC-SCNC: 7 MMOL/L — SIGNIFICANT CHANGE UP (ref 5–17)
APTT BLD: 30.9 SEC — SIGNIFICANT CHANGE UP (ref 28.5–37)
AST SERPL-CCNC: 9 U/L — LOW (ref 15–37)
BILIRUB SERPL-MCNC: 0.4 MG/DL — SIGNIFICANT CHANGE UP (ref 0.2–1.2)
BUN SERPL-MCNC: 10 MG/DL — SIGNIFICANT CHANGE UP (ref 7–23)
CALCIUM SERPL-MCNC: 8.2 MG/DL — LOW (ref 8.5–10.1)
CHLORIDE SERPL-SCNC: 108 MMOL/L — SIGNIFICANT CHANGE UP (ref 96–108)
CK MB BLD-MCNC: <0.8 % — SIGNIFICANT CHANGE UP (ref 0–3.5)
CK MB CFR SERPL CALC: <1 NG/ML — SIGNIFICANT CHANGE UP (ref 0.5–3.6)
CK SERPL-CCNC: 128 U/L — SIGNIFICANT CHANGE UP (ref 26–192)
CO2 SERPL-SCNC: 25 MMOL/L — SIGNIFICANT CHANGE UP (ref 22–31)
CREAT SERPL-MCNC: 0.71 MG/DL — SIGNIFICANT CHANGE UP (ref 0.5–1.3)
GLUCOSE SERPL-MCNC: 87 MG/DL — SIGNIFICANT CHANGE UP (ref 70–99)
HCG SERPL-ACNC: <1 MIU/ML — SIGNIFICANT CHANGE UP
HCT VFR BLD CALC: 40.2 % — SIGNIFICANT CHANGE UP (ref 34.5–45)
HGB BLD-MCNC: 13.2 G/DL — SIGNIFICANT CHANGE UP (ref 11.5–15.5)
INR BLD: 0.97 RATIO — SIGNIFICANT CHANGE UP (ref 0.88–1.16)
MCHC RBC-ENTMCNC: 29.7 PG — SIGNIFICANT CHANGE UP (ref 27–34)
MCHC RBC-ENTMCNC: 32.8 GM/DL — SIGNIFICANT CHANGE UP (ref 32–36)
MCV RBC AUTO: 90.5 FL — SIGNIFICANT CHANGE UP (ref 80–100)
NRBC # BLD: 0 /100 WBCS — SIGNIFICANT CHANGE UP (ref 0–0)
PLATELET # BLD AUTO: 405 K/UL — HIGH (ref 150–400)
POTASSIUM SERPL-MCNC: 4 MMOL/L — SIGNIFICANT CHANGE UP (ref 3.5–5.3)
POTASSIUM SERPL-SCNC: 4 MMOL/L — SIGNIFICANT CHANGE UP (ref 3.5–5.3)
PROT SERPL-MCNC: 6.9 GM/DL — SIGNIFICANT CHANGE UP (ref 6–8.3)
PROTHROM AB SERPL-ACNC: 10.9 SEC — SIGNIFICANT CHANGE UP (ref 10–12.9)
RBC # BLD: 4.44 M/UL — SIGNIFICANT CHANGE UP (ref 3.8–5.2)
RBC # FLD: 13.2 % — SIGNIFICANT CHANGE UP (ref 10.3–14.5)
SODIUM SERPL-SCNC: 140 MMOL/L — SIGNIFICANT CHANGE UP (ref 135–145)
TROPONIN I SERPL-MCNC: <.015 NG/ML — SIGNIFICANT CHANGE UP (ref 0.01–0.04)
WBC # BLD: 7.13 K/UL — SIGNIFICANT CHANGE UP (ref 3.8–10.5)
WBC # FLD AUTO: 7.13 K/UL — SIGNIFICANT CHANGE UP (ref 3.8–10.5)

## 2019-06-24 PROCEDURE — 93010 ELECTROCARDIOGRAM REPORT: CPT

## 2019-06-24 PROCEDURE — 70450 CT HEAD/BRAIN W/O DYE: CPT | Mod: 26

## 2019-06-24 PROCEDURE — 71275 CT ANGIOGRAPHY CHEST: CPT | Mod: 26

## 2019-06-24 PROCEDURE — 99284 EMERGENCY DEPT VISIT MOD MDM: CPT

## 2019-06-24 NOTE — ED ADULT NURSE NOTE - NSIMPLEMENTINTERV_GEN_ALL_ED
Implemented All Universal Safety Interventions:  [place] to call system. Call bell, personal items and telephone within reach. Instruct patient to call for assistance. Room bathroom lighting operational. Non-slip footwear when patient is off stretcher. Physically safe environment: no spills, clutter or unnecessary equipment. Stretcher in lowest position, wheels locked, appropriate side rails in place.

## 2019-06-24 NOTE — ED PROVIDER NOTE - OBJECTIVE STATEMENT
ambulating  felt body slowing down,   vision black few seconds  echo in back ground 24 year old female presents today c/o having a near syncopal episode while working, she was walking wih another employee and describes feeling as if her body was slowing down hearing echos in the backing, shortly after her vision went black for a few seconds, she was places down to sit and slowly returned to her normal self (-) chest pain (-) sob (-) nausea or vomitig (-) palpitations

## 2020-03-25 NOTE — ED PROVIDER NOTE - CROS ED ENDOCRINE ALL NEG
REFERRING PHYSICIAN: Antonio Weber MD    DATE:  03/25/2020    PROCEDURE:  EGD with optical endomicroscopy, EGD with endoscopic mucosal resection x3 bites, EGD with suturing.    PREOPERATIVE DIAGNOSIS:  This is a very pleasant 63-year-old lady with recently diagnosed carcinoma of esophagus and a nodule at the GE junction Z-line.    POSTOPERATIVE DIAGNOSIS:  Endoscopic mucosal resection performed after optical endomicroscopy revealed small area with tumor.  Suturing performed successfully to close the deep ulcer.    MEDICATION:  MAC.    DESCRIPTION OF PROCEDURE :  With the patient lying in left lateral position, Olympus upper endoscope was advanced into the esophagus.  Upper middle lower esophagus examined carefully.  The Z-line was located at approximately 37 cm.  On the right side ulceration, erythema, nodularity extended from 37-38 cm with some nodularity extending proximally towards the squamous segment.  Optical endomicroscopy was performed.  This revealed tumor in an area less than a cm wide and over about 1.5 cm in craniocaudal dimension.  Optical endomicroscopy did reveal columnar epithelium on the site laterally, but no tumor extending laterally.  The scope was withdrawn and loaded with the Duette EMR device.  Scope was then reintroduced into the esophagus.  Endoscopic mucosal resection was performed at 35-38 cm in single bite.  It was felt that the tumor was removed almost completely if not completely in this single bite.  Distally, some nodularity was noted.  Therefore, second bite was performed from 38-39 cm has gastric piece.  A third bite was performed to the left side to get rid of any chance of residual tumor.  There was some bleeding from the EMR site.  However, other than that muscle layer appeared good.  Epi was injected to secure hemostasis.  Coag grasper was used.  Following this, a suturing device dual channel was loaded on the scope and reintroduced.  Suturing was performed to close the gap  completely.  The patient tolerated the procedure well.    RECOMMENDATION:  We will bring the patient back in 6-8 weeks and perform cutting of the sutures, biopsies of the ulcer area, and serial endomicroscopy to make sure there is no residual tumor.  Any residual Manning's will also be ablated at the same time.  Following that, we will refer the patient back to care of Dr. Hermosillo for extensive endoscopic surveillance with 3 monthly endoscopies for 2 years, followed by 6 monthly   endoscopies for 2 years, and then yearly.    Thank you, Dr. Hermsoillo, for referring this pleasant lady.        Dictated By:  Antonio Weber MD        D:  03/25/2020 12:11:19  T:  03/25/2020 12:35:47  KA/modl  Job:  [id number]  cc:    CC:  MD Oskar Nelson MD        cc:  Antonio Weber MD   negative...

## 2020-04-07 ENCOUNTER — APPOINTMENT (OUTPATIENT)
Dept: DISASTER EMERGENCY | Facility: CLINIC | Age: 25
End: 2020-04-07

## 2020-04-26 ENCOUNTER — MESSAGE (OUTPATIENT)
Age: 25
End: 2020-04-26

## 2020-05-05 LAB
SARS-COV-2 IGG SERPL IA-ACNC: 5 INDEX
SARS-COV-2 IGG SERPL QL IA: POSITIVE

## 2020-06-25 ENCOUNTER — OUTPATIENT (OUTPATIENT)
Dept: OUTPATIENT SERVICES | Facility: HOSPITAL | Age: 25
LOS: 1 days | Discharge: HOME | End: 2020-06-25
Payer: COMMERCIAL

## 2020-06-25 DIAGNOSIS — A15.0 TUBERCULOSIS OF LUNG: ICD-10-CM

## 2020-06-25 PROCEDURE — 71046 X-RAY EXAM CHEST 2 VIEWS: CPT | Mod: 26

## 2020-09-11 ENCOUNTER — EMERGENCY (EMERGENCY)
Facility: HOSPITAL | Age: 25
LOS: 0 days | Discharge: HOME | End: 2020-09-11
Payer: MEDICAID

## 2020-09-11 VITALS
OXYGEN SATURATION: 100 % | HEART RATE: 64 BPM | DIASTOLIC BLOOD PRESSURE: 77 MMHG | WEIGHT: 167.99 LBS | HEIGHT: 61 IN | TEMPERATURE: 98 F | SYSTOLIC BLOOD PRESSURE: 122 MMHG | RESPIRATION RATE: 17 BRPM

## 2020-09-11 PROCEDURE — 99284 EMERGENCY DEPT VISIT MOD MDM: CPT

## 2020-09-11 PROCEDURE — 73110 X-RAY EXAM OF WRIST: CPT | Mod: 26,RT

## 2020-09-11 RX ORDER — IBUPROFEN 200 MG
1 TABLET ORAL
Qty: 28 | Refills: 0
Start: 2020-09-11 | End: 2020-09-17

## 2020-09-11 RX ORDER — KETOROLAC TROMETHAMINE 30 MG/ML
60 SYRINGE (ML) INJECTION ONCE
Refills: 0 | Status: DISCONTINUED | OUTPATIENT
Start: 2020-09-11 | End: 2020-09-11

## 2020-09-11 RX ADMIN — Medication 60 MILLIGRAM(S): at 16:02

## 2020-09-11 NOTE — ED PROVIDER NOTE - CLINICAL SUMMARY MEDICAL DECISION MAKING FREE TEXT BOX
pt with atraumatic wrist pain. Advised to ice, rest, and do no heavy lifting. F/U with Ortho in one week. pt with atraumatic wrist pain. Advised to ice, rest, and do no heavy lifting. F/U with Ortho in one week. writs splint, pain meds   Discussed results and outcome of testing with the patient.  Patient advised to please follow up with their primary care doctor within the next 24-48 hours and return to the Emergency Department for worsening symptoms or any other concerns.  Patient advised that their doctor may call  to follow up on the specific results of the tests performed today in the emergency department.

## 2020-09-11 NOTE — ED ADULT NURSE NOTE - OBJECTIVE STATEMENT
pt alert and oriented x4, ambulating pt started to feel right wrist pain radi ting to the right thumb x1week, pt stated pain feels tightness, pain 5 at rest, 8 with movement. pt denies falls/injuries/headache/dizziness/sob/chestpain/numbness/tingling. this nurse notes pt able to rotate wrist, no swelling/redness noted.

## 2020-09-11 NOTE — ED PROVIDER NOTE - OBJECTIVE STATEMENT
24 y/o F Hx of Anemia presents with c/o wrist pain. pt unaware what caused the pain. 26 y/o F Hx of Anemia presents with c/o right  wrist pain. pt unaware what caused the pain. Denies trauma, sob, chest pain , nausea, vomiting, fever

## 2020-09-11 NOTE — ED ADULT TRIAGE NOTE - CHIEF COMPLAINT QUOTE
26 y/o female with no PMH. Presents to ED with C/C of right wrist pain that radiates to the right thumb for the past month. Pain worst upon wrist rotation. LMP 9/5/2020.

## 2020-09-11 NOTE — ED ADULT NURSE NOTE - NSIMPLEMENTINTERV_GEN_ALL_ED
[de-identified] : The patient reports left lower quadrant abdominal pain comes intermittently. She denies upper symptoms including lack of heartburn, odynophagia, dysphagia or satiety. Her weight has been increasing. She misses bowel movements intermittently but denies rectal bleeding.\par \par She feels very short time and is not eating healthy diet Implemented All Universal Safety Interventions:  [place] to call system. Call bell, personal items and telephone within reach. Instruct patient to call for assistance. Room bathroom lighting operational. Non-slip footwear when patient is off stretcher. Physically safe environment: no spills, clutter or unnecessary equipment. Stretcher in lowest position, wheels locked, appropriate side rails in place.

## 2020-09-11 NOTE — ED PROVIDER NOTE - MUSCULOSKELETAL, MLM
right wrist with negative snuff box, active ROM, no erythema right wrist with negative snuff box, active ROM, no erythema no sts

## 2020-09-11 NOTE — ED ADULT NURSE NOTE - CHIEF COMPLAINT QUOTE
24 y/o female with no PMH. Presents to ED with C/C of right wrist pain that radiates to the right thumb for the past month. Pain worst upon wrist rotation. LMP 9/5/2020.

## 2020-09-14 DIAGNOSIS — M25.531 PAIN IN RIGHT WRIST: ICD-10-CM

## 2020-09-14 DIAGNOSIS — D64.9 ANEMIA, UNSPECIFIED: ICD-10-CM

## 2020-10-13 ENCOUNTER — EMERGENCY (EMERGENCY)
Facility: HOSPITAL | Age: 25
LOS: 0 days | Discharge: HOME | End: 2020-10-13
Payer: COMMERCIAL

## 2020-10-13 VITALS
OXYGEN SATURATION: 99 % | TEMPERATURE: 98 F | WEIGHT: 153 LBS | DIASTOLIC BLOOD PRESSURE: 75 MMHG | RESPIRATION RATE: 19 BRPM | SYSTOLIC BLOOD PRESSURE: 117 MMHG | HEIGHT: 61 IN | HEART RATE: 88 BPM
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
## 2020-10-13 DIAGNOSIS — D64.9 ANEMIA, UNSPECIFIED: ICD-10-CM
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
## 2020-10-13 DIAGNOSIS — S39.012A STRAIN OF MUSCLE, FASCIA AND TENDON OF LOWER BACK, INITIAL ENCOUNTER: ICD-10-CM

## 2020-10-13 DIAGNOSIS — M54.5 LOW BACK PAIN: ICD-10-CM

## 2020-10-13 DIAGNOSIS — Z79.1 LONG TERM (CURRENT) USE OF NON-STEROIDAL ANTI-INFLAMMATORIES (NSAID): ICD-10-CM

## 2020-10-13 PROCEDURE — 99284 EMERGENCY DEPT VISIT MOD MDM: CPT

## 2020-10-13 PROCEDURE — 72100 X-RAY EXAM L-S SPINE 2/3 VWS: CPT | Mod: 26

## 2020-10-13 RX ORDER — OXYCODONE AND ACETAMINOPHEN 5; 325 MG/1; MG/1
1 TABLET ORAL
Qty: 12 | Refills: 0
Start: 2020-10-13 | End: 2020-10-15

## 2020-10-13 RX ORDER — KETOROLAC TROMETHAMINE 30 MG/ML
60 SYRINGE (ML) INJECTION ONCE
Refills: 0 | Status: DISCONTINUED | OUTPATIENT
Start: 2020-10-13 | End: 2020-10-13

## 2020-10-13 RX ORDER — CYCLOBENZAPRINE HYDROCHLORIDE 10 MG/1
5 TABLET, FILM COATED ORAL ONCE
Refills: 0 | Status: COMPLETED | OUTPATIENT
Start: 2020-10-13 | End: 2020-10-13

## 2020-10-13 RX ORDER — IBUPROFEN 200 MG
1 TABLET ORAL
Qty: 15 | Refills: 0
Start: 2020-10-13 | End: 2020-10-17

## 2020-10-13 RX ADMIN — CYCLOBENZAPRINE HYDROCHLORIDE 5 MILLIGRAM(S): 10 TABLET, FILM COATED ORAL at 13:09

## 2020-10-13 RX ADMIN — Medication 60 MILLIGRAM(S): at 13:09

## 2020-10-13 NOTE — ED PROVIDER NOTE - CARE PLAN
Principal Discharge DX:	Back strain, initial encounter  Secondary Diagnosis:	Fall, initial encounter

## 2020-10-13 NOTE — ED ADULT TRIAGE NOTE - CHIEF COMPLAINT QUOTE
patient c/o of lower back pain started on Sunday , patient had a fall while working , denied hitting head no LOC

## 2020-10-13 NOTE — ED ADULT NURSE NOTE - NSIMPLEMENTINTERV_GEN_ALL_ED
Implemented All Universal Safety Interventions:  [place] to call system. Call bell, personal items and telephone within reach. Instruct patient to call for assistance. Room bathroom lighting operational. Non-slip footwear when patient is off stretcher. Physically safe environment: no spills, clutter or unnecessary equipment. Stretcher in lowest position, wheels locked, appropriate side rails in place.

## 2020-10-13 NOTE — ED PROVIDER NOTE - OBJECTIVE STATEMENT
this is a 24 yo F lmp s/p trip and fall x  3day. Denies loc, head trauma, neck pain, chest pain, blood thinners, neuro deficit.

## 2020-10-13 NOTE — ED PROVIDER NOTE - PATIENT PORTAL LINK FT
You can access the FollowMyHealth Patient Portal offered by [facility] by registering at the following website: [email or web address]. By joining [facility]’s FollowMyHealth portal, you will also be able to view your health information using other applications (apps) compatible with our system.

## 2020-10-13 NOTE — ED PROVIDER NOTE - MUSCULOSKELETAL, MLM
Spine appears normal, range of motion is not limited, no muscle or joint tenderness; + Arom, good pusle and sensory, no erythema, no warmth

## 2020-10-13 NOTE — ED ADULT NURSE NOTE - OBJECTIVE STATEMENT
Patient received with complaints of pain to lower back from a slip and fall on Sunday. States took Motrin 600mg around 7 Am with no relief.

## 2020-10-20 ENCOUNTER — APPOINTMENT (OUTPATIENT)
Dept: PHYSICAL MEDICINE AND REHAB | Facility: CLINIC | Age: 25
End: 2020-10-20
Payer: OTHER MISCELLANEOUS

## 2020-10-20 VITALS
DIASTOLIC BLOOD PRESSURE: 80 MMHG | SYSTOLIC BLOOD PRESSURE: 125 MMHG | TEMPERATURE: 97.7 F | OXYGEN SATURATION: 97 % | HEART RATE: 75 BPM

## 2020-10-20 DIAGNOSIS — Z86.2 PERSONAL HISTORY OF DISEASES OF THE BLOOD AND BLOOD-FORMING ORGANS AND CERTAIN DISORDERS INVOLVING THE IMMUNE MECHANISM: ICD-10-CM

## 2020-10-20 DIAGNOSIS — Z83.3 FAMILY HISTORY OF DIABETES MELLITUS: ICD-10-CM

## 2020-10-20 PROCEDURE — 99204 OFFICE O/P NEW MOD 45 MIN: CPT

## 2020-10-20 RX ORDER — DICLOFENAC SODIUM 10 MG/G
1 GEL TOPICAL DAILY
Qty: 100 | Refills: 0 | Status: DISCONTINUED | COMMUNITY
Start: 2017-04-12 | End: 2020-10-20

## 2020-10-20 NOTE — HISTORY OF PRESENT ILLNESS
[FreeTextEntry1] : Ms. KRISTI LONG is a 25 year old  female who presents with low back pain. Patient had a trip and near fall on 10/11/20.  went to ED, got LS Spine X-ray showing no fracture. Felt a "a pop" in her back at time. \par \par Location: Center in low back, but sometimes over PSIS bilaterally R>L\par Onset:After near fall\par Provocation/Palliative:Prolonged sitting, going from sit to stand\par Quality:Stiff\par Radiation:None\par Severity:7/10, 9/10\par Timing:Not improving. \par \par Denies any associated numbness. Denies any associated leg weakness. Denies any loss of bowel/bladder control or any groin numbness.\par Previous medications trialed: Toradol, Flexeril, Percocet\par Previous procedures relevant to complaint:None\par Conservative therapy tried?:None\par

## 2020-10-20 NOTE — DATA REVIEWED
[Plain X-Rays] : plain X-Rays [FreeTextEntry1] : X-ray LS Spine: no gross fracture seen\par \par \par EXAM:  SPINE LUMBOSACRAL TRAUMA                      \par \par \par PROCEDURE DATE:  10/13/2020  \par \par \par \par INTERPRETATION:  STUDY: Lumbosacral spine.\par \par DATE OF STUDY: 10/13/2020\par \par COMPARISON: None.\par \par HISTORY: back pain\par \par Technique: 4 LS spine films.\par \par FINDINGS: Evaluation demonstrates no acute fracture or spinal malalignment. No significant osteoarthritic changes are seen. The intervertebral disc spaces and exit neuroforamina are preserved.\par Mild thoracolumbar levoscoliosis noted.\par The visualized bony structures of the pelvic ring are intact.\par \par IMPRESSION: No acute fracture or spinal malalignment seen.\par \par \par \par \par \par \par \par GAEL WHITLEY M.D., ATTENDING RADIOLOGIST\par This document has been electronically signed. Oct 13 2020  1:27PM

## 2020-10-20 NOTE — PHYSICAL EXAM
[FreeTextEntry1] : PE: Entire PE done with Brigette in the room\par Constitutional: In NAD, calm and cooperative\par HEENT: NCAT, Anicteric sclera, no lid-lag\par Cardio: Extremities appear pink and well perfused, no peripheral edema\par Respiratory: Normal respiratory effort on room air, no accessory muscle use\par Skin: no rashes seen on exposed skin, no visible abrasions\par Psych: Normal affect, intact judgment and insight\par Neuro: Awake, alert and oriented x 3, see below for focused neurological exam\par MSK (Low back):\par 	Inspection: no gross swelling identified\par 	Palpation: Tenderness of the bilateral lower lumbar paraspinals, Tenderness of the bilateral PSIS\par 	ROM: Pain at end lumbar extension and flexion\par 	Strength: 5/5 strength in bilateral lower extremities except for 4/5 strength in R knee flexion due to pain\par 	Reflexes: 2+ Patella reflex bilaterally, 2+ Achilles reflex bilaterally, negative clonus bilaterally\par 	Sensation: Intact to light touch in bilateral lower extremities\par 	Special tests: SLR negative bilaterally, TULIO positive  bilaterally, FADIR negative bilaterally, Facet loading positive bilaterally,  Ritesh’s finger positive bilaterally, Yoeman’s test positive bilaterally\par

## 2020-11-27 ENCOUNTER — APPOINTMENT (OUTPATIENT)
Dept: PHYSICAL MEDICINE AND REHAB | Facility: CLINIC | Age: 25
End: 2020-11-27
Payer: OTHER MISCELLANEOUS

## 2020-11-27 VITALS
DIASTOLIC BLOOD PRESSURE: 93 MMHG | TEMPERATURE: 97.8 F | HEART RATE: 95 BPM | SYSTOLIC BLOOD PRESSURE: 134 MMHG | OXYGEN SATURATION: 99 %

## 2020-11-27 PROCEDURE — 99214 OFFICE O/P EST MOD 30 MIN: CPT

## 2020-11-27 NOTE — ASSESSMENT
[FreeTextEntry1] : Ms. KRISTI LONG is a 25 year old  female who presents with bilateral, R>L, low back pain likely secondary to sacroiliac joint pain. Patient has had some relief with Mobic but says Ibuprofen 800mg actually gives her better relief, along with about 2 pills of the cyclobenzaprine a day. For now, patient will continue PT, switch from Mobic to Ibuprofen (cautioned patient not to take other NSAIDs with it including Mobic) and continue Cyclobenzaprine. Briefly discussed an SI joint injection in the future that could be considered if pain doesn't resolve. Patient in agreement to this plan.

## 2020-11-27 NOTE — HISTORY OF PRESENT ILLNESS
[FreeTextEntry1] : Ms. KRISTI LONG is a 25 year old  female who presents for follow up. \par \par Location: Center in low back, but sometimes over PSIS bilaterally R>L\par Onset:After near fall at work\par Provocation/Palliative:Prolonged sitting, going from sit to stand\par Quality:Stiff\par Radiation:into R buttock\par Severity:4/10\par Timing:Improved with cyclobenzaprine and PT\par \par \par No bowel/bladder changes. No groin numbness.

## 2020-11-27 NOTE — PHYSICAL EXAM
[FreeTextEntry1] : PE: \par Constitutional: In NAD, calm and cooperative\par HEENT: NCAT, Anicteric sclera, no lid-lag\par Cardio: Extremities appear pink and well perfused, no peripheral edema\par Respiratory: Normal respiratory effort on room air, no accessory muscle use\par Skin: no rashes seen on exposed skin, no visible abrasions\par Psych: Normal affect, intact judgment and insight\par Neuro: Awake, alert and oriented x 3, see below for focused neurological exam\par MSK (Low back):\par 	Inspection: no gross swelling identified\par 	Palpation: Tenderness of the bilateral lower lumbar paraspinals, Tenderness of the bilateral R>L PSIS\par 	ROM: Pain at end lumbar extension and flexion\par 	Strength: 5/5 strength in bilateral lower extremities except for 4/5 strength in R knee flexion due to pain\par 	Reflexes: 2+ Patella reflex bilaterally, 2+ Achilles reflex bilaterally, negative clonus bilaterally\par 	Sensation: Intact to light touch in bilateral lower extremities\par 	Special tests: SLR negative bilaterally, TULIO positive  bilaterally\par

## 2020-12-09 NOTE — ED PROVIDER NOTE - DISPOSITION TYPE
DISCHARGE

## 2020-12-29 ENCOUNTER — APPOINTMENT (OUTPATIENT)
Dept: PHYSICAL MEDICINE AND REHAB | Facility: CLINIC | Age: 25
End: 2020-12-29
Payer: OTHER MISCELLANEOUS

## 2020-12-29 VITALS
OXYGEN SATURATION: 98 % | SYSTOLIC BLOOD PRESSURE: 113 MMHG | TEMPERATURE: 97.6 F | HEART RATE: 73 BPM | DIASTOLIC BLOOD PRESSURE: 78 MMHG

## 2020-12-29 PROCEDURE — 99072 ADDL SUPL MATRL&STAF TM PHE: CPT

## 2020-12-29 PROCEDURE — 99214 OFFICE O/P EST MOD 30 MIN: CPT

## 2020-12-29 NOTE — PHYSICAL EXAM
[FreeTextEntry1] : PE: \par Constitutional: In NAD, calm and cooperative\par HEENT: NCAT, Anicteric sclera, no lid-lag\par Cardio: Extremities appear pink and well perfused, no peripheral edema\par Respiratory: Normal respiratory effort on room air, no accessory muscle use\par Skin: no rashes seen on exposed skin, no visible abrasions\par Psych: Normal affect, intact judgment and insight\par Neuro: Awake, alert and oriented x 3, see below for focused neurological exam\par MSK (Low back):\par 	Inspection: no gross swelling identified\par 	Palpation: Tenderness of the bilateral lower lumbar paraspinals, Tenderness of the bilateral R>L PSIS\par 	ROM: Pain at end lumbar extension and flexion\par 	Strength: 5/5 strength in bilateral lower extremities except for 4/5 strength in R knee flexion due to pain\par 	Sensation: Grossly Intact to light touch in bilateral lower extremities\par \par

## 2020-12-29 NOTE — HISTORY OF PRESENT ILLNESS
[FreeTextEntry1] : Ms. KRISTI LONG is a 25 year old  female who presents for follow up. At last visit, patient was advised to continue PT, switch from Mobic to Ibuprofen and continue cyclobenzaprine. Overall patient feels like medication is not working as well as it used to. \par \par Location: Center in low back, but sometimes over PSIS bilaterally R>L\par Onset:After near fall at work\par Provocation/Palliative:Prolonged sitting, going from sit to stand\par Quality:Stiff\par Radiation:into R>L buttock\par Severity:4-5/10\par Timing:Improved with cyclobenzaprine and PT\par \par No bowel/bladder changes. No groin numbness.

## 2020-12-29 NOTE — ASSESSMENT
[FreeTextEntry1] : Ms. KRISTI LONG is a 25 year old  female who presents with bilateral low back pain likely due to sacroiliac joint related pain. Patient has tried conservative therapy without significant relief. Discussed the R/B/A of a bilateral sacroiliac joint injection and patient would like to proceed. Will schedule patient for injection. All questions answered.

## 2021-01-01 NOTE — ED PROVIDER NOTE - ENMT, MLM
The procedure was performed independently Airway patent, Nasal mucosa clear. Mouth with normal mucosa. Throat has no vesicles, no oropharyngeal exudates and uvula is midline.

## 2021-02-23 ENCOUNTER — APPOINTMENT (OUTPATIENT)
Dept: PHYSICAL MEDICINE AND REHAB | Facility: CLINIC | Age: 26
End: 2021-02-23

## 2021-02-23 ENCOUNTER — OUTPATIENT (OUTPATIENT)
Dept: OUTPATIENT SERVICES | Facility: HOSPITAL | Age: 26
LOS: 1 days | End: 2021-02-23
Payer: COMMERCIAL

## 2021-02-23 DIAGNOSIS — M53.3 SACROCOCCYGEAL DISORDERS, NOT ELSEWHERE CLASSIFIED: ICD-10-CM

## 2021-02-23 DIAGNOSIS — M46.1 SACROILIITIS, NOT ELSEWHERE CLASSIFIED: ICD-10-CM

## 2021-02-23 PROCEDURE — G0260: CPT

## 2021-02-23 PROCEDURE — 27096 INJECT SACROILIAC JOINT: CPT | Mod: 50

## 2021-03-03 ENCOUNTER — NON-APPOINTMENT (OUTPATIENT)
Age: 26
End: 2021-03-03

## 2021-03-04 ENCOUNTER — FORM ENCOUNTER (OUTPATIENT)
Age: 26
End: 2021-03-04

## 2021-03-12 ENCOUNTER — APPOINTMENT (OUTPATIENT)
Dept: PHYSICAL MEDICINE AND REHAB | Facility: CLINIC | Age: 26
End: 2021-03-12

## 2021-03-16 ENCOUNTER — APPOINTMENT (OUTPATIENT)
Dept: PHYSICAL MEDICINE AND REHAB | Facility: CLINIC | Age: 26
End: 2021-03-16
Payer: OTHER MISCELLANEOUS

## 2021-03-16 VITALS
HEART RATE: 83 BPM | TEMPERATURE: 97.9 F | SYSTOLIC BLOOD PRESSURE: 139 MMHG | OXYGEN SATURATION: 100 % | DIASTOLIC BLOOD PRESSURE: 78 MMHG

## 2021-03-16 DIAGNOSIS — M54.16 RADICULOPATHY, LUMBAR REGION: ICD-10-CM

## 2021-03-16 DIAGNOSIS — S39.012A STRAIN OF MUSCLE, FASCIA AND TENDON OF LOWER BACK, INITIAL ENCOUNTER: ICD-10-CM

## 2021-03-16 PROCEDURE — 99214 OFFICE O/P EST MOD 30 MIN: CPT

## 2021-03-16 PROCEDURE — 99072 ADDL SUPL MATRL&STAF TM PHE: CPT

## 2021-03-16 RX ORDER — CYCLOBENZAPRINE HYDROCHLORIDE 5 MG/1
5 TABLET, FILM COATED ORAL
Qty: 30 | Refills: 1 | Status: ACTIVE | COMMUNITY
Start: 2020-10-20 | End: 1900-01-01

## 2021-03-16 RX ORDER — IBUPROFEN 800 MG/1
800 TABLET, FILM COATED ORAL TWICE DAILY
Qty: 60 | Refills: 0 | Status: ACTIVE | COMMUNITY
Start: 2020-11-27 | End: 1900-01-01

## 2021-03-16 RX ORDER — MELOXICAM 7.5 MG/1
7.5 TABLET ORAL DAILY
Qty: 60 | Refills: 1 | Status: DISCONTINUED | COMMUNITY
Start: 2020-10-20 | End: 2021-03-16

## 2021-03-16 NOTE — HISTORY OF PRESENT ILLNESS
[FreeTextEntry1] : Ms. KRISTI LONG is a 26 year old  female who presents for follow up. Patient s/p bilateral bilateral SI joint injection on 2/23/21. The injections helped a lot with the left sided back/buttock pain but says the soreness in the right buttock remained.  An MRI L Spine was also ordered to evaluate for any possible nerve impingements contributing to pain. \par \par Patient now says her pain is much better, only having to use Ibuprofen and Cyclobenzaprine PRN, denies any fevers/chills, changes in bowel/bladder habits or other new symptoms. Does occasionally have mild numbness over R lateral thigh. \par \par Location: Center in low back, but sometimes over PSIS bilaterally R>L\par Onset:After near fall at work\par Provocation/Palliative:Prolonged sitting, going from sit to stand\par Quality:Stiffness\par Radiation:into R>L buttock\par Severity:2/10\par Timing:Improved with cyclobenzaprine, PT and injections\par \par No bowel/bladder changes. No groin numbness.

## 2021-03-16 NOTE — PHYSICAL EXAM
[FreeTextEntry1] : PE: \par Constitutional: In NAD, calm and cooperative\par HEENT: NCAT, Anicteric sclera, no lid-lag\par Cardio: Extremities appear pink and well perfused, no peripheral edema\par Respiratory: Normal respiratory effort on room air, no accessory muscle use\par Skin: no rashes seen on exposed skin, no visible abrasions\par Psych: Normal affect, intact judgment and insight\par Neuro: Awake, alert and oriented x 3, see below for focused neurological exam\par MSK (Low back):\par 	Inspection: no gross swelling identified, no erythema or skin changes around injection sites. \par 	Palpation: Tenderness of the bilateral lower lumbar paraspinals, Tenderness of the bilateral R>L PSIS\par 	ROM:Mild pain at end lumbar extension and flexion\par 	Strength: 5/5 strength in bilateral lower extremities\par 	Sensation: Grossly Intact to light touch in bilateral lower extremities, reflexes symmetric in bilateral LE\par

## 2021-03-16 NOTE — ASSESSMENT
[FreeTextEntry1] : Ms. KRISTI LONG is a 25 year old  female who presents with bilateral low back pain likely due to sacroiliac joint related pain. Patient received significant improvement in pain with bilateral SI joint injections, but still has some underlying lingering pain, with intermittent numbness over R lateral thigh. Denies any red flag signs. Will recommend:\par - Patient pending MRI L Spine to evaluate for possible underlying nerve root impingement contributing to her pain\par - She will continue Ibuprofen 800mg and Cyclobenzaprine PRN Qhs. Refills given today. Advised of side effects. \par \par Return after MRI. Patient in agreement with plan.

## 2021-04-11 ENCOUNTER — OUTPATIENT (OUTPATIENT)
Dept: OUTPATIENT SERVICES | Facility: HOSPITAL | Age: 26
LOS: 1 days | End: 2021-04-11
Payer: COMMERCIAL

## 2021-04-11 ENCOUNTER — APPOINTMENT (OUTPATIENT)
Dept: MRI IMAGING | Facility: CLINIC | Age: 26
End: 2021-04-11
Payer: OTHER MISCELLANEOUS

## 2021-04-11 DIAGNOSIS — M53.3 SACROCOCCYGEAL DISORDERS, NOT ELSEWHERE CLASSIFIED: ICD-10-CM

## 2021-04-11 PROCEDURE — 72148 MRI LUMBAR SPINE W/O DYE: CPT | Mod: 26

## 2021-04-11 PROCEDURE — 72148 MRI LUMBAR SPINE W/O DYE: CPT

## 2021-04-13 ENCOUNTER — NON-APPOINTMENT (OUTPATIENT)
Age: 26
End: 2021-04-13

## 2021-05-10 NOTE — ED PROVIDER NOTE - NEUROLOGICAL, MLM
Alert and oriented, no focal deficits, no motor or sensory deficits.
no outdoor environmental allergies/no indoor environmental allergies/no reactions to food/no reactions to animals

## 2021-08-24 ENCOUNTER — APPOINTMENT (OUTPATIENT)
Dept: PHYSICAL MEDICINE AND REHAB | Facility: CLINIC | Age: 26
End: 2021-08-24
Payer: OTHER MISCELLANEOUS

## 2021-08-24 VITALS
HEART RATE: 70 BPM | TEMPERATURE: 97.1 F | OXYGEN SATURATION: 97 % | DIASTOLIC BLOOD PRESSURE: 83 MMHG | SYSTOLIC BLOOD PRESSURE: 115 MMHG

## 2021-08-24 DIAGNOSIS — M53.3 SACROCOCCYGEAL DISORDERS, NOT ELSEWHERE CLASSIFIED: ICD-10-CM

## 2021-08-24 DIAGNOSIS — G89.29 LOW BACK PAIN: ICD-10-CM

## 2021-08-24 DIAGNOSIS — M54.5 LOW BACK PAIN: ICD-10-CM

## 2021-08-24 DIAGNOSIS — M62.830 MUSCLE SPASM OF BACK: ICD-10-CM

## 2021-08-24 PROCEDURE — 99214 OFFICE O/P EST MOD 30 MIN: CPT

## 2021-08-24 PROCEDURE — 99072 ADDL SUPL MATRL&STAF TM PHE: CPT

## 2021-08-24 NOTE — ASSESSMENT
[FreeTextEntry1] : Ms. KRISTI LONG is a 25 year old  female who presents with bilateral low back pain likely due to sacroiliac joint related pain. Patient received significant improvement in pain with bilateral SI joint injections, but a similar pain has now returned. Denies any red flag signs. Will recommend:\par - Discussed with patient the risks (including but not limited to bleeding, infection, nerve damage, etc), benefits and alternatives to a bilateral SI joint injection for which patient understands. Patient aware she needs a COVID swab. \par - She will continue Ibuprofen 800mg and Cyclobenzaprine PRN Qhs.Patient aware of potential side effects.\par \par Return for procedure. Patient aware of red flag signs including any changes to their bowel/bladder control, groin numbness or new weakness. Patient knows to seek immediate attention by calling 911 or going to nearest ER if these symptoms appear.

## 2021-08-24 NOTE — HISTORY OF PRESENT ILLNESS
[FreeTextEntry1] : Ms. KRISTI LONG is a 26 year old  female who presents for follow up. At last visit an MRI L Spine was ordered that showed an adnexal and renal cyst, for which she was advised to f/u with PCP (for which she did f/u with OBGYN saying the adnexal cyst has resolved and the PCP is following the renal cyst). She was also continued on Ibuprofen/Cyclobenzaprine PRN. Overall she was doing well but a few weeks ago, patient felt the same pain return. Denies any other new symptoms. \par \par Location: Center in low back, but sometimes over PSIS bilaterally R>L\par Onset:After near fall at work, which improved, but worsened in early 8/2021\par Provocation/Palliative:Prolonged sitting, going from sit to stand\par Quality:Stiffness\par Radiation:into R>L buttock\par Severity:6-7/10\par Timing:Improved with cyclobenzaprine, PT and injections\par \par No bowel/bladder changes. No groin numbness.

## 2021-08-24 NOTE — DATA REVIEWED
[FreeTextEntry1] : \par   MR Lumbar Spine No Cont             Final\par \par No Documents Attached\par \par \par \par \par   EXAM:  MR SPINE LUMBAR\par \par \par PROCEDURE DATE:  04/11/2021\par \par \par \par INTERPRETATION:  MR LUMBAR SPINE\par \par CLINICAL INFORMATION: Low back pain worse on the right, status post injury.\par TECHNIQUE: Multiplanar, multisequence MR imaging was obtained of the lumbar spine without intravenous contrast.\par COMPARISON: Radiographs dated 10/13/2020.\par \par FINDINGS:\par \par SPINAL ALIGNMENT: Mild levocurvature. Straightening of the normal lumbar lordosis.\par DISTAL CORD AND CONUS: No cord signal abnormality. Conus ends at the level of L2.\par SI JOINTS: Subchondral sclerosis on iliac sides of the sacroiliac joints bilaterally, consistent with osteitis condensans ilii.\par MARROW: No fracture.\par PARASPINAL MUSCLE AND SOFT TISSUES: Unremarkable.\par INTRAABDOMINAL/INTRAPELVIC SOFT TISSUES: Left adnexal cystic mass which measures up to 4.5 cm. Small amount of free fluid in the cul-de-sac and left adnexal region. Left renal 1.2 cm cyst.\par \par DISC LEVEL EVALUATION: There are no disc herniations at any level.\par \par T11/T12: No spinal canal stenosis or neural foraminal narrowing. Lateral recesses are preserved.\par T12/L1: No spinal canal stenosis or neural foraminal narrowing. Lateral recesses are preserved.\par L1/L2: No spinal canal stenosis or neural foraminal narrowing. Lateral recesses are preserved.\par L2/L3: No spinal canal stenosis or neural foraminal narrowing. Lateral recesses are preserved.\par L3/L4: No spinal canal stenosis or neural foraminal narrowing. Lateral recesses are preserved.\par L4/L5: No spinal canal stenosis or neural foraminal narrowing. Lateral recesses are preserved.\par L5/S1: No spinal canal stenosis or neural foraminal narrowing. Lateral recesses are preserved.\par \par IMPRESSION:\par \par No disc herniations. No spinal canal stenosis or foraminal narrowing at any level.\par Left adnexal cystic mass.\par \par \par \par \par \par HERMINIO KINNEY MD; Fellow Radiology\par This document has been electronically signed.\par ADRIANA SANDERS MD; Attending Radiologist\par This document has been electronically signed. Apr 13 2021  9:16AM\par \par  \par \par  Ordered by: AILYN LUNA       Collected/Examined: [id number] 08:23AM       \par Verified by: AILYN LUNA 13Apr2021 09:32AM       \par  Result Communication: No patient communication needed at this time;\par Stage: Final       \par  Performed at: [facility]       Resulted: 13Apr2021 08:11AM       Last Updated: 13Apr2021 09:32AM       Accession: [id number]

## 2021-08-24 NOTE — PHYSICAL EXAM
[FreeTextEntry1] : PE: \par Constitutional: In NAD, calm and cooperative\par HEENT: NCAT, Anicteric sclera, no lid-lag\par Cardio: Extremities appear pink and well perfused, no peripheral edema\par Respiratory: Normal respiratory effort on room air, no accessory muscle use\par Skin: no rashes seen on exposed skin, no visible abrasions\par Psych: Normal affect, intact judgment and insight\par Neuro: Awake, alert and oriented x 3, see below for focused neurological exam\par MSK (Low back):\par 	Inspection: no gross swelling identified, no erythema or skin changes\par 	Palpation: Tenderness of the bilateral lower lumbar paraspinals, Tenderness of the bilateral R>L PSIS\par 	ROM:Mild pain at end lumbar extension and flexion\par 	Strength: 5/5 strength in bilateral lower extremities\par 	Sensation: Grossly Intact to light touch in bilateral lower extremities, reflexes symmetric in bilateral LE\par TULIO: positive bilaterally\par Ritesh's finger: positive bilaterally\par

## 2021-12-01 NOTE — ED PROVIDER NOTE - NS HIV RISK FACTOR YES
I am not sure why they were treating with oral antibiotics. It is unclear if tubes were seen in UC. Please schedule with me tomorrow so we can check tubes and prescribe drops.    Declined

## 2023-01-24 NOTE — ED PROVIDER NOTE - CPE EDP HEAD NORM PED
Detail Level: Detailed Depth Of Biopsy: dermis Was A Bandage Applied: Yes Size Of Lesion In Cm: 0.4 X Size Of Lesion In Cm: 0.5 Biopsy Type: H and E Biopsy Method: Dermablade Head atraumatic, normal cephalic shape. Anesthesia Type: 1% lidocaine with epinephrine Anesthesia Volume In Cc (Will Not Render If 0): 1.5 Additional Anesthesia Volume In Cc (Will Not Render If 0): 0 Hemostasis: Drysol Wound Care: Petrolatum Dressing: bandage Type Of Destruction Used: Electrodesiccation Curettage Text: The wound bed was treated with curettage after the biopsy was performed. Cryotherapy Text: The wound bed was treated with cryotherapy after the biopsy was performed. Electrodesiccation Text: The wound bed was treated with electrodesiccation after the biopsy was performed. Electrodesiccation And Curettage Text: The wound bed was treated with electrodesiccation and curettage after the biopsy was performed. Silver Nitrate Text: The wound bed was treated with silver nitrate after the biopsy was performed. Lab: 6 Lab Facility: 3 [facility] Notes In Note?: No Consent: Written consent was obtained and risks were reviewed including but not limited to scarring, infection, bleeding, scabbing, incomplete removal, nerve damage and allergy to anesthesia. Post-Care Instructions: I reviewed with the patient in detail post-care instructions. Patient is to keep the biopsy site dry overnight, and then apply bacitracin twice daily until healed. Patient may apply hydrogen peroxide soaks to remove any crusting. Notification Instructions: Patient will be notified of biopsy results. However, patient instructed to call the office if not contacted within 2 weeks. Billing Type: Third-Party Bill Information: Selecting Yes will display possible errors in your note based on the variables you have selected. This validation is only offered as a suggestion for you. PLEASE NOTE THAT THE VALIDATION TEXT WILL BE REMOVED WHEN YOU FINALIZE YOUR NOTE. IF YOU WANT TO FAX A PRELIMINARY NOTE YOU WILL NEED TO TOGGLE THIS TO 'NO' IF YOU DO NOT WANT IT IN YOUR FAXED NOTE.

## 2023-10-01 PROBLEM — M54.16 LUMBAR RADICULAR PAIN: Status: ACTIVE | Noted: 2021-03-16

## 2024-01-17 ENCOUNTER — APPOINTMENT (OUTPATIENT)
Dept: ORTHOPEDIC SURGERY | Facility: CLINIC | Age: 29
End: 2024-01-17

## 2025-02-17 NOTE — ED PROVIDER NOTE - PSH
02/17/25 1504   NIV Type   NIV Started/Stopped On   Equipment Type v60   Mode Bilevel   Mask Type Full face mask   Mask Size Medium   Assessment   Pulse 61   Respirations 29   SpO2 91 %   Level of Consciousness 0   Comfort Level Good   Using Accessory Muscles No   Mask Compliance Good   Settings/Measurements   PIP Observed 12 cm H20   IPAP 12 cmH20   CPAP/EPAP 8 cmH2O   Vt (Measured) 433 mL   Rate Ordered 18   FiO2  80 %   I Time/ I Time % 0.8 s   Minute Volume (L/min) 11.7 Liters   Mask Leak (lpm) 58 lpm   Patient's Home Machine No   Alarm Settings   Alarms On Y   Low Pressure (cmH2O) 5 cmH2O   High Pressure (cmH2O) 35 cmH2O   Apnea (secs) 20 secs   RR Low (bpm) 8   RR High (bpm) 45 br/min        No significant past surgical history

## 2025-03-20 NOTE — ED PROVIDER NOTE - CPE EDP [facility] NORM
Please check in on [place] tomorrow. IC note and CXR (normal) reviewed. Evaluation was overall reassuring including PE and pulse ox. Thanks,  To    normal...